# Patient Record
Sex: FEMALE | Race: WHITE | Employment: FULL TIME | URBAN - METROPOLITAN AREA
[De-identification: names, ages, dates, MRNs, and addresses within clinical notes are randomized per-mention and may not be internally consistent; named-entity substitution may affect disease eponyms.]

---

## 2018-05-29 ENCOUNTER — OFFICE VISIT (OUTPATIENT)
Dept: PHYSICAL THERAPY | Age: 53
End: 2018-05-29
Attending: ORTHOPAEDIC SURGERY
Payer: COMMERCIAL

## 2018-05-29 DIAGNOSIS — M17.11 UNILATERAL PRIMARY OSTEOARTHRITIS, RIGHT KNEE: ICD-10-CM

## 2018-05-29 PROCEDURE — 97140 MANUAL THERAPY 1/> REGIONS: CPT

## 2018-05-29 PROCEDURE — 97161 PT EVAL LOW COMPLEX 20 MIN: CPT

## 2018-05-29 PROCEDURE — 97110 THERAPEUTIC EXERCISES: CPT

## 2018-05-29 NOTE — PROGRESS NOTES
KNEE EVALUATION:   Referring Physician: Dr. Ariana Vee  Diagnosis: primary OA of right knee   Date of Service: 5/29/2018     PATIENT SUMMARY   Joey Michelle is a 46year old y/o female who presents to therapy today with complaints of Right knee pain  Pt describ ext during SAQ. Pt and PT discuss HEP, anatomy, surgery and POC. Pt voiced understanding and performs HEP with some end range pain in flexion during heel slides but with PT VC reported pain reduces.  It is medically necessary for pt to continue PT to reach Neuromuscular Re-education;  Therapeutic Activity; Gait Training; Pt education; Home exercise program instructions;     Education or treatment limitation: None  Rehab Potential:good    Patient/Family/Caregiver was advised of these findings, precautions, and

## 2018-05-31 ENCOUNTER — OFFICE VISIT (OUTPATIENT)
Dept: PHYSICAL THERAPY | Age: 53
End: 2018-05-31
Attending: ORTHOPAEDIC SURGERY
Payer: COMMERCIAL

## 2018-05-31 DIAGNOSIS — M17.11 UNILATERAL PRIMARY OSTEOARTHRITIS, RIGHT KNEE: ICD-10-CM

## 2018-05-31 PROCEDURE — 97140 MANUAL THERAPY 1/> REGIONS: CPT

## 2018-05-31 PROCEDURE — 97110 THERAPEUTIC EXERCISES: CPT

## 2018-05-31 NOTE — PROGRESS NOTES
Dx: Right knee OA         Authorized # of Visits:  5         Next MD visit: none scheduled  Fall Risk: standard         Precautions: n/a             Subjective: Pt states her knee was really bothering her yesterday.   She states today she woke up and it is SAQ 20x         HS str 3x30s         Clams 2x10         Bridges 2x10         PROM knee flex/ext         gastroc stretch 3x30s         Neuro  Standing weight shifts with 10 deg knee flexion, hands on table 2sx10         Manual  Quad STM  Patellar mobs gra

## 2018-06-06 ENCOUNTER — OFFICE VISIT (OUTPATIENT)
Dept: PHYSICAL THERAPY | Age: 53
End: 2018-06-06
Attending: ORTHOPAEDIC SURGERY
Payer: COMMERCIAL

## 2018-06-06 PROCEDURE — 97140 MANUAL THERAPY 1/> REGIONS: CPT

## 2018-06-06 PROCEDURE — 97110 THERAPEUTIC EXERCISES: CPT

## 2018-06-06 NOTE — PROGRESS NOTES
Dx: Right knee OA         Authorized # of Visits:  5         Next MD visit: none scheduled  Fall Risk: standard         Precautions: n/a             Subjective: Pt states she went to the ER Monday night because she was feeling terrible and was so constipat 6/6/18         TX#: 3/5   Date:               TX#: 4/ Date:               TX#: 5/ Date:               TX#: 6/ Date:               TX#: 7/ Date:               TX#: 8/   Therex  nustep 8 min Therex  nustep 10 min        Heel slides 20x Heel slides 20x

## 2018-06-07 ENCOUNTER — OFFICE VISIT (OUTPATIENT)
Dept: PHYSICAL THERAPY | Age: 53
End: 2018-06-07
Attending: ORTHOPAEDIC SURGERY
Payer: COMMERCIAL

## 2018-06-07 DIAGNOSIS — M17.11 UNILATERAL PRIMARY OSTEOARTHRITIS, RIGHT KNEE: ICD-10-CM

## 2018-06-07 PROCEDURE — 97140 MANUAL THERAPY 1/> REGIONS: CPT

## 2018-06-07 PROCEDURE — 97110 THERAPEUTIC EXERCISES: CPT

## 2018-06-07 NOTE — PROGRESS NOTES
Dx: Right knee OA         Authorized # of Visits:  5         Next MD visit: none scheduled  Fall Risk: standard         Precautions: n/a             Subjective: Pt states her knee is really bothering her today.   She states she is not really sure why gerardo TX#: 5/ Date:               TX#: 6/ Date:               TX#: 7/ Date:               TX#: 8/   Therex  nustep 8 min Therex  nustep 10 min Therex  nustep 10 min       Heel slides 20x Heel slides 20x Heel slides 20x       Quad sets 3sx20 LAQ20x hold       SAQ

## 2018-06-12 ENCOUNTER — OFFICE VISIT (OUTPATIENT)
Dept: PHYSICAL THERAPY | Age: 53
End: 2018-06-12
Attending: ORTHOPAEDIC SURGERY
Payer: COMMERCIAL

## 2018-06-12 DIAGNOSIS — M17.11 UNILATERAL PRIMARY OSTEOARTHRITIS, RIGHT KNEE: ICD-10-CM

## 2018-06-12 PROCEDURE — 97110 THERAPEUTIC EXERCISES: CPT

## 2018-06-12 PROCEDURE — 97140 MANUAL THERAPY 1/> REGIONS: CPT

## 2018-06-12 NOTE — PROGRESS NOTES
Dx: Right knee OA         Authorized # of Visits:  5         Next MD visit: none scheduled  Fall Risk: standard         Precautions: n/a             Subjective: Pt states her knee is feeling ok.   She states    Objective: AROM: *with pain (from IE)   Knee str 3x30s HS str 3x30s      Clams 2x10 Clams 2x10 Clams 2x10 Clams 2x10      Bridges 2x10 Bridges 2x10 Bridges 2x10 Bridges 2x10      PROM knee flex/ext PROM knee flex/ext PROM knee flex/ext PROM knee flex/ext      gastroc stretch 3x30s gastroc stretch 3x3

## 2018-06-14 ENCOUNTER — OFFICE VISIT (OUTPATIENT)
Dept: PHYSICAL THERAPY | Age: 53
End: 2018-06-14
Attending: ORTHOPAEDIC SURGERY
Payer: COMMERCIAL

## 2018-06-14 DIAGNOSIS — M17.11 UNILATERAL PRIMARY OSTEOARTHRITIS, RIGHT KNEE: ICD-10-CM

## 2018-06-14 PROCEDURE — 97140 MANUAL THERAPY 1/> REGIONS: CPT

## 2018-06-14 PROCEDURE — 97110 THERAPEUTIC EXERCISES: CPT

## 2018-06-14 NOTE — PROGRESS NOTES
Dx: Right knee OA         Authorized # of Visits:  5         Next MD visit: none scheduled  Fall Risk: standard         Precautions: n/a             Subjective: Pt states her knee was really sore after the last visit.   She states it is still a little sore 3x30s HS str 3x30s HS str 3x30s HS str 3x30s HS str 3x30s     Clams 2x10 Clams 2x10 Clams 2x10 Clams 2x10 Clams 2x10     Bridges 2x10 Bridges 2x10 Bridges 2x10 Bridges 2x10 Bridges 2x10     PROM knee flex/ext PROM knee flex/ext PROM knee flex/ext PROM knee

## 2018-06-19 ENCOUNTER — OFFICE VISIT (OUTPATIENT)
Dept: PHYSICAL THERAPY | Age: 53
End: 2018-06-19
Attending: ORTHOPAEDIC SURGERY
Payer: COMMERCIAL

## 2018-06-19 DIAGNOSIS — M17.11 UNILATERAL PRIMARY OSTEOARTHRITIS, RIGHT KNEE: ICD-10-CM

## 2018-06-19 PROCEDURE — 97110 THERAPEUTIC EXERCISES: CPT

## 2018-06-19 PROCEDURE — 97140 MANUAL THERAPY 1/> REGIONS: CPT

## 2018-06-21 ENCOUNTER — OFFICE VISIT (OUTPATIENT)
Dept: PHYSICAL THERAPY | Age: 53
End: 2018-06-21
Attending: ORTHOPAEDIC SURGERY
Payer: COMMERCIAL

## 2018-06-21 DIAGNOSIS — M17.11 UNILATERAL PRIMARY OSTEOARTHRITIS, RIGHT KNEE: ICD-10-CM

## 2018-06-21 PROCEDURE — 97110 THERAPEUTIC EXERCISES: CPT

## 2018-06-21 PROCEDURE — 97140 MANUAL THERAPY 1/> REGIONS: CPT

## 2018-06-21 NOTE — PROGRESS NOTES
Dx: Right knee OA         Authorized # of Visits:  5         Next MD visit: none scheduled  Fall Risk: standard         Precautions: n/a             Subjective: Pt states her knee is feeling ok.   She states she was able to do the straightening of her knee slides 20x   Quad sets 3sx20 LAQ20x hold hold Prone quad str 3x30s  - Prone quad str 3x30s   SAQ 20x SAQ 20x SAQ 20x SAQ 20x SAQ 20x SAQ 20x SAQ 20x DKSA asist   HS str 3x30s HS str 3x30s HS str 3x30s HS str 3x30s HS str 3x30s HS str 3x30s HS str 3x30s   C CPx10 min for swelling and pain control Modalities   CPx10 min for swelling and pain control Modalities   CPx10 min for swelling and pain control Modalities   CPx10 min for swelling and pain control Modalities   CPx10 min for swelling and pain control Mo

## 2018-06-26 ENCOUNTER — OFFICE VISIT (OUTPATIENT)
Dept: PHYSICAL THERAPY | Age: 53
End: 2018-06-26
Attending: ORTHOPAEDIC SURGERY
Payer: COMMERCIAL

## 2018-06-26 DIAGNOSIS — M17.11 UNILATERAL PRIMARY OSTEOARTHRITIS, RIGHT KNEE: ICD-10-CM

## 2018-06-26 PROCEDURE — 97112 NEUROMUSCULAR REEDUCATION: CPT

## 2018-06-26 PROCEDURE — 97110 THERAPEUTIC EXERCISES: CPT

## 2018-06-26 PROCEDURE — 97140 MANUAL THERAPY 1/> REGIONS: CPT

## 2018-06-26 NOTE — PROGRESS NOTES
Dx: Right knee OA         Authorized # of Visits:  5         Next MD visit: none scheduled  Fall Risk: standard         Precautions: n/a             Subjective: Pt states the back of her knee has been bothering her a lot when she is walking.   She states sh TX#: 6/ Date:6/19/18       TX#: 7/18 Date:6/21/18  TX#: 8/18 Date:6/26/18  TX#: 9/18     Therex  nustep 10 min Therex  nustep 10 min Therex  nustep 10 min Therex  nustep 10 min Therex  nustep 10 min Therex  nustep 10 min     Heel slides 20x Heel slides 20 relax     Modalities   CPx10 min for swelling and pain control Modalities   CPx10 min for swelling and pain control Modalities   CPx10 min for swelling and pain control Modalities   CPx10 min for swelling and pain control Modalities   CPx5 min for swelling

## 2018-06-28 ENCOUNTER — OFFICE VISIT (OUTPATIENT)
Dept: PHYSICAL THERAPY | Age: 53
End: 2018-06-28
Attending: ORTHOPAEDIC SURGERY
Payer: COMMERCIAL

## 2018-06-28 DIAGNOSIS — M17.11 UNILATERAL PRIMARY OSTEOARTHRITIS, RIGHT KNEE: ICD-10-CM

## 2018-06-28 PROCEDURE — 97110 THERAPEUTIC EXERCISES: CPT

## 2018-06-28 PROCEDURE — 97140 MANUAL THERAPY 1/> REGIONS: CPT

## 2018-06-28 NOTE — PROGRESS NOTES
Dx: Right knee OA         Authorized # of Visits:  5         Next MD visit: none scheduled  Fall Risk: standard         Precautions: n/a             Subjective: Pt states her knee is feeling ok.   She states she had to go up a flight of stairs at work and i DLP 3c 20x  shuttle DLP 4c 20x shuttle DLP 4c 20x    PROM knee flex/ext PROM knee flex/ext PROM knee flex/ext PROM knee flex/ext    gastroc stretch 3x30s gastroc stretch 3x30s gastroc stretch 3x30s gastroc stretch 3x30s    Prone knee ext hang 2 min 0# Pron

## 2018-07-03 ENCOUNTER — OFFICE VISIT (OUTPATIENT)
Dept: PHYSICAL THERAPY | Age: 53
End: 2018-07-03
Attending: ORTHOPAEDIC SURGERY
Payer: COMMERCIAL

## 2018-07-03 PROCEDURE — 97140 MANUAL THERAPY 1/> REGIONS: CPT

## 2018-07-03 PROCEDURE — 97110 THERAPEUTIC EXERCISES: CPT

## 2018-07-03 NOTE — PROGRESS NOTES
Dx: Right knee OA         Authorized # of Visits:  5         Next MD visit: none scheduled  Fall Risk: standard         Precautions: n/a             Subjective: Pt states her knee is feeling about the same.   She states she has pain in the back of her knee Clams 2x10 - -   Bridges 2x10  shuttle DLP 3c 20x  shuttle DLP 4c 20x shuttle DLP 4c 20x shuttle DLP 4c 20x   PROM knee flex/ext PROM knee flex/ext PROM knee flex/ext PROM knee flex/ext PROM knee flex/ext   gastroc stretch 3x30s gastroc stretch 3x30s gastr

## 2018-07-05 ENCOUNTER — APPOINTMENT (OUTPATIENT)
Dept: PHYSICAL THERAPY | Age: 53
End: 2018-07-05
Attending: ORTHOPAEDIC SURGERY
Payer: COMMERCIAL

## 2018-07-06 ENCOUNTER — APPOINTMENT (OUTPATIENT)
Dept: PHYSICAL THERAPY | Age: 53
End: 2018-07-06
Attending: ORTHOPAEDIC SURGERY
Payer: COMMERCIAL

## 2018-07-10 ENCOUNTER — APPOINTMENT (OUTPATIENT)
Dept: PHYSICAL THERAPY | Age: 53
End: 2018-07-10
Attending: ORTHOPAEDIC SURGERY
Payer: COMMERCIAL

## 2018-07-12 ENCOUNTER — OFFICE VISIT (OUTPATIENT)
Dept: PHYSICAL THERAPY | Age: 53
End: 2018-07-12
Attending: ORTHOPAEDIC SURGERY
Payer: COMMERCIAL

## 2018-07-12 PROCEDURE — 97140 MANUAL THERAPY 1/> REGIONS: CPT

## 2018-07-12 PROCEDURE — 97110 THERAPEUTIC EXERCISES: CPT

## 2018-07-12 NOTE — PROGRESS NOTES
Dx: Right knee OA         Authorized # of Visits:  5         Next MD visit: none scheduled  Fall Risk: standard         Precautions: n/a             Subjective: Pt states her knee is feeling a little bit better in the last 10 days.   She states she still ca str 3x30s    SAQ 20x SAQ 20x DKSA asist SAQ 20x DKSA asist SAQ 20x DKSA asist TKE YTB 10x 5s hold TKE YTB 10x 5s hold    HS str 3x30s HS str 3x30s HS str 3x30s HS str 3x30s HS str 3x30s -    Clams 2x10 Clams 2x10 Clams 2x10 - -     Bridges 2x10  shuttle DL

## 2018-07-17 ENCOUNTER — OFFICE VISIT (OUTPATIENT)
Dept: PHYSICAL THERAPY | Age: 53
End: 2018-07-17
Attending: ORTHOPAEDIC SURGERY
Payer: COMMERCIAL

## 2018-07-17 PROCEDURE — 97110 THERAPEUTIC EXERCISES: CPT

## 2018-07-17 PROCEDURE — 97140 MANUAL THERAPY 1/> REGIONS: CPT

## 2018-07-17 NOTE — PROGRESS NOTES
Dx: Right knee OA         Authorized # of Visits:  5         Next MD visit: none scheduled  Fall Risk: standard         Precautions: n/a             Subjective: Pt states her knee was really bad over the weekend.   She states she went to the movies and when Prone quad str 3x30s Prone quad str 3x30s Prone quad str 3x30s Prone quad str 3x30s    SAQ 20x SAQ 20x DKSA asist SAQ 20x DKSA asist SAQ 20x DKSA asist TKE YTB 10x 5s hold TKE YTB 10x 5s hold TKE YTB 10x 5s hold   HS str 3x30s HS str 3x30s HS str 3x30s HS Modalities   CPx5 min for swelling and pain control Modalities   CPx5 min for swelling and pain control Modalities   CPx5 min for swelling and pain control        Charges: manual x1.  therex x1,   Total Timed Treatment: 30 min  Total Treatment Time: 35 min

## 2018-08-24 ENCOUNTER — HOSPITAL ENCOUNTER (OUTPATIENT)
Dept: CT IMAGING | Facility: HOSPITAL | Age: 53
Discharge: HOME | End: 2018-08-24
Attending: ORTHOPAEDIC SURGERY
Payer: COMMERCIAL

## 2018-08-24 DIAGNOSIS — T84.032A MECHANICAL LOOSENING OF INTERNAL RIGHT KNEE PROSTHETIC JOINT, INITIAL ENCOUNTER (HCC): ICD-10-CM

## 2018-08-24 PROCEDURE — 73700 CT LOWER EXTREMITY W/O DYE: CPT | Performed by: ORTHOPAEDIC SURGERY
